# Patient Record
Sex: FEMALE | Race: WHITE | NOT HISPANIC OR LATINO | Employment: OTHER | URBAN - METROPOLITAN AREA
[De-identification: names, ages, dates, MRNs, and addresses within clinical notes are randomized per-mention and may not be internally consistent; named-entity substitution may affect disease eponyms.]

---

## 2019-06-10 ENCOUNTER — OFFICE VISIT (OUTPATIENT)
Dept: URGENT CARE | Facility: PHYSICIAN GROUP | Age: 74
End: 2019-06-10
Payer: MEDICARE

## 2019-06-10 ENCOUNTER — HOSPITAL ENCOUNTER (OUTPATIENT)
Dept: RADIOLOGY | Facility: MEDICAL CENTER | Age: 74
End: 2019-06-10
Attending: FAMILY MEDICINE
Payer: MEDICARE

## 2019-06-10 VITALS
OXYGEN SATURATION: 94 % | WEIGHT: 163 LBS | HEART RATE: 101 BPM | DIASTOLIC BLOOD PRESSURE: 92 MMHG | RESPIRATION RATE: 18 BRPM | TEMPERATURE: 96.9 F | BODY MASS INDEX: 27.83 KG/M2 | HEIGHT: 64 IN | SYSTOLIC BLOOD PRESSURE: 150 MMHG

## 2019-06-10 DIAGNOSIS — R07.9 CHEST PAIN, UNSPECIFIED TYPE: ICD-10-CM

## 2019-06-10 DIAGNOSIS — M54.2 NECK PAIN: ICD-10-CM

## 2019-06-10 PROCEDURE — 71046 X-RAY EXAM CHEST 2 VIEWS: CPT

## 2019-06-10 PROCEDURE — 99204 OFFICE O/P NEW MOD 45 MIN: CPT | Performed by: FAMILY MEDICINE

## 2019-06-10 RX ORDER — HYDROCHLOROTHIAZIDE 25 MG/1
TABLET ORAL
Refills: 1 | COMMUNITY
Start: 2019-06-04

## 2019-06-10 RX ORDER — THYROID 60 MG/1
60 TABLET ORAL
COMMUNITY
Start: 2018-12-17

## 2019-06-10 RX ORDER — POTASSIUM CHLORIDE 750 MG/1
TABLET, EXTENDED RELEASE ORAL
Refills: 2 | COMMUNITY
Start: 2019-06-04

## 2019-06-10 ASSESSMENT — ENCOUNTER SYMPTOMS
WEIGHT LOSS: 0
ABDOMINAL PAIN: 0
EYE DISCHARGE: 0
NERVOUS/ANXIOUS: 0
FOCAL WEAKNESS: 0
PALPITATIONS: 0
NECK PAIN: 1
SORE THROAT: 0
FLANK PAIN: 0
MYALGIAS: 0
SENSORY CHANGE: 0

## 2019-06-10 ASSESSMENT — LIFESTYLE VARIABLES: SUBSTANCE_ABUSE: 0

## 2019-06-10 NOTE — PROGRESS NOTES
Subjective:      Deisi Botello is a 74 y.o. female who presents with Neck Pain (chest [id number]// after yardwork )            Onset 01:455 this morning pleuritic chest pain and associated neck pain. 6/10 severity.  She had immediate relief with ice last night.  Thinks associated with working in the yard yesterday with overhead cutter. No SOB. No nausea. No diaphoresis. No palpitations. No PMH cardiac. No other aggravating or alleviating factors.          Review of Systems   Constitutional: Negative for malaise/fatigue and weight loss.   HENT: Negative for congestion and sore throat.    Eyes: Negative for discharge.   Cardiovascular: Negative for palpitations and leg swelling.   Gastrointestinal: Negative for abdominal pain.   Genitourinary: Negative for flank pain and hematuria.   Musculoskeletal: Positive for neck pain (chronic). Negative for joint pain and myalgias.   Skin: Negative for itching and rash.   Neurological: Negative for sensory change and focal weakness.   Psychiatric/Behavioral: Negative for substance abuse. The patient is not nervous/anxious.      .  Medications, Allergies, and current problem list reviewed today in [facility]  PMH:  has no past medical history on file.  MEDS:   Current Outpatient Prescriptions:   •  hydroCHLOROthiazide (HYDRODIURIL) 25 MG Tab, TK 1 T PO QD, Disp: , Rfl: 1  •  thyroid (ARMOUR THYROID) 60 MG Tab, Take 60 mg by mouth., Disp: , Rfl:   •  potassium chloride SA (K-DUR) 10 MEQ Tab CR, TK 1 T PO ONCE D, Disp: , Rfl: 2  ALLERGIES:   Allergies   Allergen Reactions   • Aspirin Hives   • Ciprofloxacin    • Nitrofurantoin    • Sulfa Drugs    • Azilsartan Myalgia     SURGHX: History reviewed. No pertinent surgical history.  SOCHX:  reports that she has never smoked. She has never used smokeless tobacco. She reports that she does not drink alcohol.  FH: Father with coronary artery disease at 55 years old.       Objective:     /92   Pulse (!) 101   Temp 36.1 °C (96.9 °F)  "(Temporal)   Resp 18   Ht 1.626 m (5' 4\")   Wt 73.9 kg (163 lb)   SpO2 94%   BMI 27.98 kg/m²      Physical Exam   Constitutional: She is oriented to person, place, and time. She appears well-developed and well-nourished.   HENT:   Head: Normocephalic and atraumatic.   Mouth/Throat: Oropharynx is clear and moist.   Eyes: Pupils are equal, round, and reactive to light. Conjunctivae and EOM are normal.   Neck: Neck supple. No JVD present.   Tender paracervical and SCM muscle bilateral.  Pain reproduced with head rotation.  No midline bony tenderness or step-off.   Cardiovascular: Regular rhythm and normal heart sounds.    No murmur heard.  Rate now 70s   Pulmonary/Chest: Effort normal. She has no wheezes. She has no rales.   Musculoskeletal: She exhibits no edema or tenderness.   Neurological: She is alert and oriented to person, place, and time.   Skin: Skin is warm and dry. No rash noted.               Assessment/Plan:   EKG: NSR rate:77 , normal axis, normal intervals, LVH by voltage criteria.  Nonspecific T wave inversions V2 and V3.  No evidence of acute ischemia.    Chest x-ray per radiology:  1.  Mild linear and interstitial opacifications are noted which could indicate bronchitis inflammation or edema.    2.  No consolidations identified.    1. Chest pain, unspecified type  DX-CHEST-2 VIEWS    EKG   2. Neck pain       Differential diagnosis, natural history, supportive care, and indications for immediate follow-up discussed at length.     Clinical findings are not consistent with pulmonary edema or bronchitis.  Low probability cardiac etiology however I cannot rule out ACS here.  Patient and family understand that and will make their decision to go to the ER or not with this information.  "

## 2021-01-14 DIAGNOSIS — Z23 NEED FOR VACCINATION: ICD-10-CM
